# Patient Record
Sex: MALE | Race: WHITE | NOT HISPANIC OR LATINO | Employment: FULL TIME
[De-identification: names, ages, dates, MRNs, and addresses within clinical notes are randomized per-mention and may not be internally consistent; named-entity substitution may affect disease eponyms.]

---

## 2017-04-25 PROCEDURE — 82043 UR ALBUMIN QUANTITATIVE: CPT | Performed by: INTERNAL MEDICINE

## 2017-04-25 PROCEDURE — 82570 ASSAY OF URINE CREATININE: CPT | Performed by: INTERNAL MEDICINE

## 2017-06-13 PROCEDURE — 84156 ASSAY OF PROTEIN URINE: CPT | Performed by: INTERNAL MEDICINE

## 2017-06-13 PROCEDURE — 81001 URINALYSIS AUTO W/SCOPE: CPT | Performed by: INTERNAL MEDICINE

## 2017-06-13 PROCEDURE — 82570 ASSAY OF URINE CREATININE: CPT | Performed by: INTERNAL MEDICINE

## 2017-06-13 PROCEDURE — 82043 UR ALBUMIN QUANTITATIVE: CPT | Performed by: INTERNAL MEDICINE

## 2017-07-11 ENCOUNTER — DIAGNOSTIC TRANS (OUTPATIENT)
Dept: OTHER | Age: 47
End: 2017-07-11

## 2017-07-11 ENCOUNTER — HOSPITAL (OUTPATIENT)
Dept: OTHER | Age: 47
End: 2017-07-11
Attending: INTERNAL MEDICINE

## 2017-07-11 LAB
INR PPP: 3.1
PROTHROMBIN TIME: 34.3 SECONDS (ref 9.7–11.8)
PROTHROMBIN TIME: ABNORMAL

## 2017-07-12 ENCOUNTER — CHARTING TRANS (OUTPATIENT)
Dept: OTHER | Age: 47
End: 2017-07-12

## 2017-07-13 PROCEDURE — 82043 UR ALBUMIN QUANTITATIVE: CPT | Performed by: INTERNAL MEDICINE

## 2017-07-13 PROCEDURE — 84156 ASSAY OF PROTEIN URINE: CPT | Performed by: INTERNAL MEDICINE

## 2017-07-13 PROCEDURE — 81003 URINALYSIS AUTO W/O SCOPE: CPT | Performed by: INTERNAL MEDICINE

## 2017-07-13 PROCEDURE — 82570 ASSAY OF URINE CREATININE: CPT | Performed by: INTERNAL MEDICINE

## 2017-08-08 ENCOUNTER — HOSPITAL (OUTPATIENT)
Dept: OTHER | Age: 47
End: 2017-08-08
Attending: INTERNAL MEDICINE

## 2017-08-08 LAB
BUN SERPL-MCNC: 17 MG/DL (ref 6–20)
BUN/CREAT SERPL: 17 (ref 7–25)
CREAT SERPL-MCNC: 1.01 MG/DL (ref 0.67–1.17)
INR PPP: 2.6
PROTHROMBIN TIME: 28.5 SECONDS (ref 9.7–11.8)
PROTHROMBIN TIME: ABNORMAL

## 2017-08-09 ENCOUNTER — CHARTING TRANS (OUTPATIENT)
Dept: OTHER | Age: 47
End: 2017-08-09

## 2017-08-09 LAB
INR PPP: 2.9
PROTHROMBIN TIME: 32.3 SECONDS (ref 9.7–11.8)
PROTHROMBIN TIME: ABNORMAL

## 2017-08-24 ENCOUNTER — HOSPITAL (OUTPATIENT)
Dept: OTHER | Age: 47
End: 2017-08-24
Attending: INTERNAL MEDICINE

## 2017-08-24 ENCOUNTER — CHARTING TRANS (OUTPATIENT)
Dept: OTHER | Age: 47
End: 2017-08-24

## 2017-08-24 ENCOUNTER — DIAGNOSTIC TRANS (OUTPATIENT)
Dept: OTHER | Age: 47
End: 2017-08-24

## 2017-08-24 LAB
ANALYZER ANC (IANC): NORMAL
ANION GAP SERPL CALC-SCNC: 11 MMOL/L (ref 10–20)
APTT PPP: 34 SECONDS (ref 22–30)
APTT PPP: ABNORMAL S
BASOPHILS # BLD: 0 THOUSAND/MCL (ref 0–0.3)
BASOPHILS NFR BLD: 1 %
BUN SERPL-MCNC: 14 MG/DL (ref 6–20)
BUN/CREAT SERPL: 14 (ref 7–25)
CALCIUM SERPL-MCNC: 9.1 MG/DL (ref 8.4–10.2)
CHLORIDE: 106 MMOL/L (ref 98–107)
CO2 SERPL-SCNC: 28 MMOL/L (ref 21–32)
CREAT SERPL-MCNC: 0.99 MG/DL (ref 0.67–1.17)
DIFFERENTIAL METHOD BLD: NORMAL
EOSINOPHIL # BLD: 0.2 THOUSAND/MCL (ref 0.1–0.5)
EOSINOPHIL NFR BLD: 3 %
ERYTHROCYTE [DISTWIDTH] IN BLOOD: 12.1 % (ref 11–15)
GLUCOSE SERPL-MCNC: 132 MG/DL (ref 65–99)
HEMATOCRIT: 42.6 % (ref 39–51)
HGB BLD-MCNC: 15.2 GM/DL (ref 13–17)
INR PPP: 2.4
LYMPHOCYTES # BLD: 1.6 THOUSAND/MCL (ref 1–4.8)
LYMPHOCYTES NFR BLD: 34 %
MCH RBC QN AUTO: 32.3 PG (ref 26–34)
MCHC RBC AUTO-ENTMCNC: 35.7 GM/DL (ref 32–36.5)
MCV RBC AUTO: 90.6 FL (ref 78–100)
MONOCYTES # BLD: 0.4 THOUSAND/MCL (ref 0.3–0.9)
MONOCYTES NFR BLD: 8 %
NEUTROPHILS # BLD: 2.6 THOUSAND/MCL (ref 1.8–7.7)
NEUTROPHILS NFR BLD: 54 %
NEUTS SEG NFR BLD: NORMAL %
PERCENT NRBC: NORMAL
PLATELET # BLD: 179 THOUSAND/MCL (ref 140–450)
POTASSIUM SERPL-SCNC: 3.5 MMOL/L (ref 3.4–5.1)
PROTHROMBIN TIME: 26.7 SECONDS (ref 9.7–11.8)
PROTHROMBIN TIME: ABNORMAL
RBC # BLD: 4.7 MILLION/MCL (ref 4.5–5.9)
SODIUM SERPL-SCNC: 141 MMOL/L (ref 135–145)
TROPONIN I SERPL HS-MCNC: 0.12 NG/ML
TROPONIN I SERPL HS-MCNC: 0.13 NG/ML
WBC # BLD: 4.7 THOUSAND/MCL (ref 4.2–11)

## 2017-10-04 PROCEDURE — 84156 ASSAY OF PROTEIN URINE: CPT | Performed by: INTERNAL MEDICINE

## 2017-10-04 PROCEDURE — 81001 URINALYSIS AUTO W/SCOPE: CPT | Performed by: INTERNAL MEDICINE

## 2017-10-04 PROCEDURE — 36415 COLL VENOUS BLD VENIPUNCTURE: CPT | Performed by: INTERNAL MEDICINE

## 2017-10-04 PROCEDURE — 82570 ASSAY OF URINE CREATININE: CPT | Performed by: INTERNAL MEDICINE

## 2017-10-04 PROCEDURE — 82043 UR ALBUMIN QUANTITATIVE: CPT | Performed by: INTERNAL MEDICINE

## 2017-10-28 PROCEDURE — 84153 ASSAY OF PSA TOTAL: CPT | Performed by: INTERNAL MEDICINE

## 2018-02-07 PROBLEM — Z80.0 FAMILY HISTORY OF COLON CANCER: Status: ACTIVE | Noted: 2018-02-07

## 2018-04-03 PROBLEM — G47.33 OSA ON CPAP: Status: ACTIVE | Noted: 2018-04-03

## 2018-04-03 PROBLEM — Z99.89 OSA ON CPAP: Status: ACTIVE | Noted: 2018-04-03

## 2018-09-17 PROCEDURE — 88305 TISSUE EXAM BY PATHOLOGIST: CPT | Performed by: INTERNAL MEDICINE

## 2019-01-28 PROCEDURE — 82570 ASSAY OF URINE CREATININE: CPT | Performed by: INTERNAL MEDICINE

## 2019-01-28 PROCEDURE — 82043 UR ALBUMIN QUANTITATIVE: CPT | Performed by: INTERNAL MEDICINE

## 2019-01-28 PROCEDURE — 81001 URINALYSIS AUTO W/SCOPE: CPT | Performed by: INTERNAL MEDICINE

## 2019-01-28 PROCEDURE — 84156 ASSAY OF PROTEIN URINE: CPT | Performed by: INTERNAL MEDICINE

## 2019-03-09 PROCEDURE — 86200 CCP ANTIBODY: CPT | Performed by: INTERNAL MEDICINE

## 2019-06-27 PROCEDURE — 80177 DRUG SCRN QUAN LEVETIRACETAM: CPT | Performed by: OTHER

## 2019-06-27 PROCEDURE — 36415 COLL VENOUS BLD VENIPUNCTURE: CPT | Performed by: OTHER

## 2019-07-11 ENCOUNTER — DIAGNOSTIC TRANS (OUTPATIENT)
Dept: OTHER | Age: 49
End: 2019-07-11

## 2019-07-11 LAB
INR PPP: 4.3
INR PPP: ABNORMAL
PROTHROMBIN TIME (PRT2): ABNORMAL
PROTHROMBIN TIME: 40.5 SEC (ref 9.7–11.8)

## 2019-09-11 ENCOUNTER — HOSPITAL (OUTPATIENT)
Dept: OTHER | Age: 49
End: 2019-09-11
Attending: INTERNAL MEDICINE

## 2019-12-23 PROBLEM — H81.11 BPPV (BENIGN PAROXYSMAL POSITIONAL VERTIGO), RIGHT: Status: ACTIVE | Noted: 2019-12-23

## 2020-05-15 PROBLEM — R56.9 SEIZURE (HCC): Status: ACTIVE | Noted: 2020-05-15

## 2020-05-15 PROBLEM — H83.2X2 VESTIBULAR DYSFUNCTION OF LEFT EAR: Status: ACTIVE | Noted: 2020-05-15

## 2020-05-15 PROBLEM — R42 VERTIGO: Status: ACTIVE | Noted: 2020-05-15

## 2020-05-15 PROBLEM — H81.92 VESTIBULAR DYSFUNCTION OF LEFT EAR: Status: ACTIVE | Noted: 2020-05-15

## 2020-05-15 PROBLEM — R42 DIZZY: Status: ACTIVE | Noted: 2020-05-15

## 2020-12-29 PROBLEM — H81.90 VESTIBULAR DYSFUNCTION, UNSPECIFIED LATERALITY: Status: ACTIVE | Noted: 2020-12-29

## 2020-12-29 PROBLEM — H83.2X9 VESTIBULAR DYSFUNCTION, UNSPECIFIED LATERALITY: Status: ACTIVE | Noted: 2020-12-29

## 2021-01-22 PROCEDURE — 88305 TISSUE EXAM BY PATHOLOGIST: CPT | Performed by: INTERNAL MEDICINE

## 2021-02-03 ENCOUNTER — TELEPHONE (OUTPATIENT)
Dept: CT IMAGING | Age: 51
End: 2021-02-03

## 2021-02-12 ENCOUNTER — HOSPITAL ENCOUNTER (OUTPATIENT)
Dept: CT IMAGING | Age: 51
Discharge: HOME | End: 2021-02-12
Attending: INTERNAL MEDICINE

## 2021-02-12 VITALS — SYSTOLIC BLOOD PRESSURE: 146 MMHG | HEART RATE: 55 BPM | DIASTOLIC BLOOD PRESSURE: 81 MMHG

## 2021-02-12 DIAGNOSIS — I48.91 ATRIAL FIBRILLATION, UNSPECIFIED TYPE (CMD): ICD-10-CM

## 2021-02-12 DIAGNOSIS — I42.9 CARDIOMYOPATHY, UNSPECIFIED TYPE (CMD): ICD-10-CM

## 2021-02-12 PROCEDURE — 75574 CT ANGIO HRT W/3D IMAGE: CPT

## 2021-02-12 PROCEDURE — 10002805 HB CONTRAST AGENT: Performed by: INTERNAL MEDICINE

## 2021-02-12 RX ADMIN — IOHEXOL 80 ML: 350 INJECTION, SOLUTION INTRAVENOUS at 12:45

## 2021-02-22 ENCOUNTER — LAB ENCOUNTER (OUTPATIENT)
Dept: LAB | Age: 51
End: 2021-02-22
Attending: INTERNAL MEDICINE
Payer: COMMERCIAL

## 2021-02-22 DIAGNOSIS — Z01.818 PRE-OP TESTING: ICD-10-CM

## 2021-02-23 LAB — SARS-COV-2 RNA RESP QL NAA+PROBE: NOT DETECTED

## 2021-02-25 ENCOUNTER — ANESTHESIA (OUTPATIENT)
Dept: ENDOSCOPY | Facility: HOSPITAL | Age: 51
End: 2021-02-25
Payer: COMMERCIAL

## 2021-02-25 ENCOUNTER — HOSPITAL ENCOUNTER (OUTPATIENT)
Facility: HOSPITAL | Age: 51
Setting detail: HOSPITAL OUTPATIENT SURGERY
Discharge: HOME | End: 2021-02-25
Attending: INTERNAL MEDICINE | Admitting: INTERNAL MEDICINE
Payer: COMMERCIAL

## 2021-02-25 ENCOUNTER — ANESTHESIA EVENT (OUTPATIENT)
Dept: ENDOSCOPY | Facility: HOSPITAL | Age: 51
End: 2021-02-25
Payer: COMMERCIAL

## 2021-02-25 VITALS
WEIGHT: 180 LBS | HEART RATE: 60 BPM | SYSTOLIC BLOOD PRESSURE: 112 MMHG | RESPIRATION RATE: 12 BRPM | BODY MASS INDEX: 29.99 KG/M2 | DIASTOLIC BLOOD PRESSURE: 79 MMHG | TEMPERATURE: 98 F | OXYGEN SATURATION: 96 % | HEIGHT: 65 IN

## 2021-02-25 DIAGNOSIS — K31.89 GASTRIC NODULE: ICD-10-CM

## 2021-02-25 DIAGNOSIS — K44.9 HIATAL HERNIA: ICD-10-CM

## 2021-02-25 DIAGNOSIS — K22.711 BARRETT'S ESOPHAGUS WITH HIGH GRADE DYSPLASIA: ICD-10-CM

## 2021-02-25 DIAGNOSIS — K22.9 IRREGULAR Z LINE OF ESOPHAGUS: ICD-10-CM

## 2021-02-25 DIAGNOSIS — Z01.818 PRE-OP TESTING: Primary | ICD-10-CM

## 2021-02-25 PROCEDURE — 88305 TISSUE EXAM BY PATHOLOGIST: CPT | Performed by: INTERNAL MEDICINE

## 2021-02-25 PROCEDURE — 0DB48ZX EXCISION OF ESOPHAGOGASTRIC JUNCTION, VIA NATURAL OR ARTIFICIAL OPENING ENDOSCOPIC, DIAGNOSTIC: ICD-10-PCS | Performed by: INTERNAL MEDICINE

## 2021-02-25 PROCEDURE — 88342 IMHCHEM/IMCYTCHM 1ST ANTB: CPT | Performed by: INTERNAL MEDICINE

## 2021-02-25 PROCEDURE — 88341 IMHCHEM/IMCYTCHM EA ADD ANTB: CPT | Performed by: INTERNAL MEDICINE

## 2021-02-25 PROCEDURE — 3E0G8GC INTRODUCTION OF OTHER THERAPEUTIC SUBSTANCE INTO UPPER GI, VIA NATURAL OR ARTIFICIAL OPENING ENDOSCOPIC: ICD-10-PCS | Performed by: INTERNAL MEDICINE

## 2021-02-25 RX ORDER — NALOXONE HYDROCHLORIDE 0.4 MG/ML
80 INJECTION, SOLUTION INTRAMUSCULAR; INTRAVENOUS; SUBCUTANEOUS AS NEEDED
Status: DISCONTINUED | OUTPATIENT
Start: 2021-02-25 | End: 2021-02-25

## 2021-02-25 RX ORDER — LIDOCAINE HYDROCHLORIDE 10 MG/ML
INJECTION, SOLUTION EPIDURAL; INFILTRATION; INTRACAUDAL; PERINEURAL AS NEEDED
Status: DISCONTINUED | OUTPATIENT
Start: 2021-02-25 | End: 2021-02-25 | Stop reason: SURG

## 2021-02-25 RX ORDER — SODIUM CHLORIDE, SODIUM LACTATE, POTASSIUM CHLORIDE, CALCIUM CHLORIDE 600; 310; 30; 20 MG/100ML; MG/100ML; MG/100ML; MG/100ML
INJECTION, SOLUTION INTRAVENOUS CONTINUOUS
Status: DISCONTINUED | OUTPATIENT
Start: 2021-02-25 | End: 2021-02-25

## 2021-02-25 RX ADMIN — LIDOCAINE HYDROCHLORIDE 50 MG: 10 INJECTION, SOLUTION EPIDURAL; INFILTRATION; INTRACAUDAL; PERINEURAL at 09:05:00

## 2021-02-25 RX ADMIN — SODIUM CHLORIDE, SODIUM LACTATE, POTASSIUM CHLORIDE, CALCIUM CHLORIDE: 600; 310; 30; 20 INJECTION, SOLUTION INTRAVENOUS at 10:12:00

## 2021-02-25 RX ADMIN — SODIUM CHLORIDE, SODIUM LACTATE, POTASSIUM CHLORIDE, CALCIUM CHLORIDE: 600; 310; 30; 20 INJECTION, SOLUTION INTRAVENOUS at 09:06:00

## 2021-02-25 NOTE — OPERATIVE REPORT
OPERATIVE REPORT   PATIENT NAME: Danielle Alvarez  MRN: [id number]  DATE OF OPERATION: 2/25/2021  PREOPERATIVE DIAGNOSIS:   1. 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 Dalila Manley on January 22, 2021. hiatal hernia Hill grade 2 was seen. The rest of the stomach examination was overall unremarkable. The bulb and second portion duodenum appeared unremarkable. The scope was then pulled out after suctioning the gastric content.     Next, the oblique hard

## 2021-02-25 NOTE — ANESTHESIA POSTPROCEDURE EVALUATION
Patient: Farhat Mojica    Procedure Summary     Date: 02/25/21 Room / Location: [facility] ENDOSCOPY 01 / [facility] ENDOSCOPY    Anesthesia Start: 0214 Anesthesia Stop:     Procedure: ESOPHAGOGASTRODUODENOSCOPY (EGD) (N/A ) Diagnosis:       Tello's esophagus with hig

## 2021-02-25 NOTE — ANESTHESIA PREPROCEDURE EVALUATION
Anesthesia PreOp Note    HPI:     Roman Purvis is a 48year old male who presents for preoperative consultation requested by: Lennox Riggers, MD    Date of Surgery: 2/25/2021    Procedure(s):  ESOPHAGOGASTRODUODENOSCOPY (EGD)  ENDOSCOPIC ULTRASOUND (EUS) anesthesia - states he is okay with propofol   • Arrhythmia    • Atrial fibrillation, unspecified type ([facility] [address] 1/7/2016   • Cardiac defibrillator in place    • Family history of colon cancer 2/7/2018   • Hematuria, gross 8/20/2014   • High blood pressure (two) times daily before meals. , Disp: 60 capsule, Rfl: 3, 2/24/2021    •  WARFARIN SODIUM 3 MG Oral Tab, TAKE 1 TABLET BY MOUTH EVERY EVENING OR AS DIRECTED BY COUMADIN CLINIC, Disp: 90 tablet, Rfl: 3, 2/19/2021    •  SIMVASTATIN 20 MG Oral Tab, TAKE 1 TA Occupational History      Not on file    Social Needs      Financial resource strain: Not on file      Food insecurity        Worry: Not on file        Inability: Not on file      Transportation needs        Medical: Not on file        Non-medical: Not on Component Value Date    BUN 12.0 02/02/2021    CREATSERUM 0.99 02/02/2021     Lab Results   Component Value Date    INR 3.0 (A) 02/16/2021       Vital Signs: Body mass index is 29.95 kg/m². height is 1.651 m (5' 5\") and weight is 81.6 kg (180 lb).  Hi thickness is  24.7 mm.   5.  Please refer to the radiologist's interpretation of non-cardiac  structures. Neuro/Psych    (+)  seizures (Last seizure 2015; stable on Keppra. Last neurology checkup within past month), CVA (2013.  Residual occasional word f

## 2021-03-03 DIAGNOSIS — Z23 NEED FOR VACCINATION: ICD-10-CM

## 2021-03-05 PROBLEM — C15.5 MALIGNANT NEOPLASM OF LOWER THIRD OF ESOPHAGUS (HCC): Status: ACTIVE | Noted: 2021-03-05

## 2021-03-08 PROBLEM — C16.0: Status: ACTIVE | Noted: 2021-03-05

## 2021-04-09 PROBLEM — C15.9 ESOPHAGEAL CANCER (HCC): Status: ACTIVE | Noted: 2021-04-09

## 2021-05-14 PROBLEM — I82.622 ARM DVT (DEEP VENOUS THROMBOEMBOLISM), ACUTE, LEFT (HCC): Status: ACTIVE | Noted: 2021-05-06

## 2021-05-14 PROBLEM — Z98.62 H/O ANGIOPLASTY: Status: ACTIVE | Noted: 2021-05-07

## 2022-05-23 DIAGNOSIS — Z01.812 PRE-PROCEDURE LAB EXAM: Primary | ICD-10-CM

## 2022-05-26 RX ORDER — DILTIAZEM HYDROCHLORIDE 240 MG/1
240 CAPSULE, EXTENDED RELEASE ORAL DAILY
COMMUNITY

## 2022-05-26 RX ORDER — SERTRALINE HYDROCHLORIDE 25 MG/1
25 TABLET, FILM COATED ORAL DAILY
COMMUNITY

## 2022-05-26 RX ORDER — METOPROLOL SUCCINATE 25 MG/1
25 TABLET, EXTENDED RELEASE ORAL 2 TIMES DAILY
COMMUNITY

## 2022-05-26 RX ORDER — OMEPRAZOLE 40 MG/1
40 CAPSULE, DELAYED RELEASE ORAL
COMMUNITY

## 2022-05-26 RX ORDER — SOTALOL HYDROCHLORIDE 120 MG/1
120 TABLET ORAL 2 TIMES DAILY
COMMUNITY

## 2022-05-26 RX ORDER — ALPRAZOLAM 0.25 MG/1
0.25 TABLET ORAL 2 TIMES DAILY PRN
COMMUNITY

## 2022-05-26 RX ORDER — WARFARIN SODIUM 3 MG/1
1.5 TABLET ORAL
COMMUNITY

## 2022-05-26 RX ORDER — WARFARIN SODIUM 3 MG/1
3 TABLET ORAL
COMMUNITY

## 2022-05-26 RX ORDER — SIMVASTATIN 20 MG
20 TABLET ORAL EVERY EVENING
COMMUNITY

## 2022-05-29 ENCOUNTER — LAB SERVICES (OUTPATIENT)
Dept: LAB | Age: 52
End: 2022-05-29

## 2022-05-29 DIAGNOSIS — Z01.812 PRE-PROCEDURE LAB EXAM: ICD-10-CM

## 2022-05-29 PROCEDURE — U0005 INFEC AGEN DETEC AMPLI PROBE: HCPCS | Performed by: INTERNAL MEDICINE

## 2022-05-29 PROCEDURE — U0003 INFECTIOUS AGENT DETECTION BY NUCLEIC ACID (DNA OR RNA); SEVERE ACUTE RESPIRATORY SYNDROME CORONAVIRUS 2 (SARS-COV-2) (CORONAVIRUS DISEASE [COVID-19]), AMPLIFIED PROBE TECHNIQUE, MAKING USE OF HIGH THROUGHPUT TECHNOLOGIES AS DESCRIBED BY CMS-2020-01-R: HCPCS | Performed by: INTERNAL MEDICINE

## 2022-05-30 LAB
SARS-COV-2 RNA RESP QL NAA+PROBE: NOT DETECTED
SERVICE CMNT-IMP: NORMAL
SERVICE CMNT-IMP: NORMAL

## 2022-05-31 ENCOUNTER — ANESTHESIA (OUTPATIENT)
Dept: CARDIOLOGY | Age: 52
End: 2022-05-31

## 2022-05-31 ENCOUNTER — ANESTHESIA EVENT (OUTPATIENT)
Dept: CARDIOLOGY | Age: 52
End: 2022-05-31

## 2022-05-31 ENCOUNTER — HOSPITAL ENCOUNTER (OUTPATIENT)
Age: 52
Discharge: HOME | End: 2022-05-31
Attending: INTERNAL MEDICINE | Admitting: INTERNAL MEDICINE

## 2022-05-31 ENCOUNTER — APPOINTMENT (OUTPATIENT)
Dept: CARDIOLOGY | Age: 52
End: 2022-05-31
Attending: INTERNAL MEDICINE

## 2022-05-31 VITALS
TEMPERATURE: 97.2 F | BODY MASS INDEX: 25.27 KG/M2 | HEIGHT: 65 IN | RESPIRATION RATE: 18 BRPM | WEIGHT: 151.68 LBS | SYSTOLIC BLOOD PRESSURE: 93 MMHG | OXYGEN SATURATION: 99 % | HEART RATE: 87 BPM | DIASTOLIC BLOOD PRESSURE: 72 MMHG

## 2022-05-31 DIAGNOSIS — I48.92 ATRIAL FLUTTER, UNSPECIFIED TYPE (CMD): ICD-10-CM

## 2022-05-31 DIAGNOSIS — I48.91 ATRIAL FIBRILLATION, UNSPECIFIED TYPE (CMD): ICD-10-CM

## 2022-05-31 LAB
ACT BLD: 400 BASELINE/TARGET RANGES ARE SET BY CLINICIANS FOR EACH PATIENT/PROCEDURE
ATRIAL RATE (BPM): 60
INR PPP: 3.1
P AXIS (DEGREES): 89
PR-INTERVAL (MSEC): 240
PROTHROMBIN TIME: 31.5 SEC (ref 9.7–11.8)
QRS-INTERVAL (MSEC): 86
QT-INTERVAL (MSEC): 484
QTC: 484
R AXIS (DEGREES): -27
REPORT TEXT: NORMAL
T AXIS (DEGREES): 149
VENTRICULAR RATE EKG/MIN (BPM): 60

## 2022-05-31 PROCEDURE — 10002803 HB RX 637: Performed by: ANESTHESIOLOGY

## 2022-05-31 PROCEDURE — 93308 TTE F-UP OR LMTD: CPT

## 2022-05-31 PROCEDURE — 85610 PROTHROMBIN TIME: CPT | Performed by: INTERNAL MEDICINE

## 2022-05-31 PROCEDURE — C1766 INTRO/SHEATH,STRBLE,NON-PEEL: HCPCS | Performed by: INTERNAL MEDICINE

## 2022-05-31 PROCEDURE — 93010 ELECTROCARDIOGRAM REPORT: CPT | Performed by: INTERNAL MEDICINE

## 2022-05-31 PROCEDURE — 13000008 HB ANESTHESIA MAC OUTSIDE OR: Performed by: INTERNAL MEDICINE

## 2022-05-31 PROCEDURE — 10002807 HB RX 258: Performed by: INTERNAL MEDICINE

## 2022-05-31 PROCEDURE — 10002801 HB RX 250 W/O HCPCS: Performed by: INTERNAL MEDICINE

## 2022-05-31 PROCEDURE — 85347 COAGULATION TIME ACTIVATED: CPT | Performed by: INTERNAL MEDICINE

## 2022-05-31 PROCEDURE — C1733 CATH, EP, OTHR THAN COOL-TIP: HCPCS | Performed by: INTERNAL MEDICINE

## 2022-05-31 PROCEDURE — 10006027 HB SUPPLY 278: Performed by: INTERNAL MEDICINE

## 2022-05-31 PROCEDURE — 93655 ICAR CATH ABLTJ DSCRT ARRHYT: CPT | Performed by: INTERNAL MEDICINE

## 2022-05-31 PROCEDURE — C2630 CATH, EP, COOL-TIP: HCPCS | Performed by: INTERNAL MEDICINE

## 2022-05-31 PROCEDURE — 10002805 HB CONTRAST AGENT: Performed by: INTERNAL MEDICINE

## 2022-05-31 PROCEDURE — C1730 CATH, EP, 19 OR FEW ELECT: HCPCS | Performed by: INTERNAL MEDICINE

## 2022-05-31 PROCEDURE — 10006023 HB SUPPLY 272: Performed by: INTERNAL MEDICINE

## 2022-05-31 PROCEDURE — C1731 CATH, EP, 20 OR MORE ELEC: HCPCS | Performed by: INTERNAL MEDICINE

## 2022-05-31 PROCEDURE — 10002807 HB RX 258: Performed by: ANESTHESIOLOGY

## 2022-05-31 PROCEDURE — 10004451 HB PACU RECOVERY 1ST 30 MINUTES: Performed by: INTERNAL MEDICINE

## 2022-05-31 PROCEDURE — 13000001 HB PHASE II RECOVERY EA 30 MINUTES: Performed by: INTERNAL MEDICINE

## 2022-05-31 PROCEDURE — 93005 ELECTROCARDIOGRAM TRACING: CPT | Performed by: INTERNAL MEDICINE

## 2022-05-31 PROCEDURE — C1760 CLOSURE DEV, VASC: HCPCS | Performed by: INTERNAL MEDICINE

## 2022-05-31 PROCEDURE — 93656 COMPRE EP EVAL ABLTJ ATR FIB: CPT | Performed by: INTERNAL MEDICINE

## 2022-05-31 PROCEDURE — 10002803 HB RX 637: Performed by: INTERNAL MEDICINE

## 2022-05-31 PROCEDURE — 10002800 HB RX 250 W HCPCS: Performed by: ANESTHESIOLOGY

## 2022-05-31 PROCEDURE — 10002801 HB RX 250 W/O HCPCS: Performed by: ANESTHESIOLOGY

## 2022-05-31 PROCEDURE — C1759 CATH, INTRA ECHOCARDIOGRAPHY: HCPCS | Performed by: INTERNAL MEDICINE

## 2022-05-31 PROCEDURE — 10004452 HB PACU ADDL 30 MINUTES: Performed by: INTERNAL MEDICINE

## 2022-05-31 PROCEDURE — C1894 INTRO/SHEATH, NON-LASER: HCPCS | Performed by: INTERNAL MEDICINE

## 2022-05-31 DEVICE — THE VASCADE MVP VENOUS VASCULAR CLOSURE SYSTEM (VVCS) IS INTENDED TO SEAL FEMORAL VEINS WITH SINGLE OR MULTIPLE ACCESS SITES IN ONE OR BOTH LIMBS AT THE COMPLETION OF CATHETERIZATION PROCEDURES. THE SYSTEM IS DESIGNED TO DELIVER A RESORBABLE COLLAGEN PATCH, EXTRA-VASCULARLY, AT VESSEL PUNCTURE SITES TO ACHIEVE HEMOSTASIS. FOR USE WITH 6FR TO 12FR (15F MAXIMUM OUTER DIAMETER) INTRODUCER SHEATHS; OVERALL LENGTH OF THE SHEATH (INCLUDING THE HUB) NEEDS TO BE LESS THAN 15CM.
Type: IMPLANTABLE DEVICE | Site: FEMORAL VEIN | Status: FUNCTIONAL
Brand: CARDIVA VASCADE MVP VVCS 6-12F

## 2022-05-31 RX ORDER — LIDOCAINE HYDROCHLORIDE 20 MG/ML
INJECTION, SOLUTION INFILTRATION; PERINEURAL PRN
Status: DISCONTINUED | OUTPATIENT
Start: 2022-05-31 | End: 2022-05-31

## 2022-05-31 RX ORDER — ALBUTEROL SULFATE 2.5 MG/3ML
5 SOLUTION RESPIRATORY (INHALATION) ONCE
Status: DISCONTINUED | OUTPATIENT
Start: 2022-05-31 | End: 2022-05-31 | Stop reason: HOSPADM

## 2022-05-31 RX ORDER — SODIUM CHLORIDE, SODIUM LACTATE, POTASSIUM CHLORIDE, CALCIUM CHLORIDE 600; 310; 30; 20 MG/100ML; MG/100ML; MG/100ML; MG/100ML
INJECTION, SOLUTION INTRAVENOUS CONTINUOUS PRN
Status: DISCONTINUED | OUTPATIENT
Start: 2022-05-31 | End: 2022-05-31

## 2022-05-31 RX ORDER — HEPARIN SODIUM 1000 [USP'U]/ML
INJECTION, SOLUTION INTRAVENOUS; SUBCUTANEOUS PRN
Status: DISCONTINUED | OUTPATIENT
Start: 2022-05-31 | End: 2022-05-31

## 2022-05-31 RX ORDER — HYDROCODONE BITARTRATE AND ACETAMINOPHEN 5; 325 MG/1; MG/1
1 TABLET ORAL EVERY 4 HOURS PRN
Status: DISCONTINUED | OUTPATIENT
Start: 2022-05-31 | End: 2022-05-31 | Stop reason: HOSPADM

## 2022-05-31 RX ORDER — ACETAMINOPHEN 325 MG/1
650 TABLET ORAL EVERY 4 HOURS PRN
Status: DISCONTINUED | OUTPATIENT
Start: 2022-05-31 | End: 2022-05-31 | Stop reason: HOSPADM

## 2022-05-31 RX ORDER — 0.9 % SODIUM CHLORIDE 0.9 %
2 VIAL (ML) INJECTION EVERY 12 HOURS SCHEDULED
Status: DISCONTINUED | OUTPATIENT
Start: 2022-05-31 | End: 2022-05-31 | Stop reason: HOSPADM

## 2022-05-31 RX ORDER — PROPOFOL 10 MG/ML
INJECTION, EMULSION INTRAVENOUS PRN
Status: DISCONTINUED | OUTPATIENT
Start: 2022-05-31 | End: 2022-05-31

## 2022-05-31 RX ORDER — METOCLOPRAMIDE HYDROCHLORIDE 5 MG/ML
INJECTION INTRAMUSCULAR; INTRAVENOUS PRN
Status: DISCONTINUED | OUTPATIENT
Start: 2022-05-31 | End: 2022-05-31

## 2022-05-31 RX ORDER — ONDANSETRON 4 MG/1
4 TABLET, ORALLY DISINTEGRATING ORAL EVERY 12 HOURS PRN
Status: DISCONTINUED | OUTPATIENT
Start: 2022-05-31 | End: 2022-05-31 | Stop reason: HOSPADM

## 2022-05-31 RX ORDER — MIDAZOLAM HYDROCHLORIDE 1 MG/ML
INJECTION, SOLUTION INTRAMUSCULAR; INTRAVENOUS PRN
Status: DISCONTINUED | OUTPATIENT
Start: 2022-05-31 | End: 2022-05-31

## 2022-05-31 RX ORDER — FAMOTIDINE 10 MG/ML
20 INJECTION, SOLUTION INTRAVENOUS ONCE
Status: DISCONTINUED | OUTPATIENT
Start: 2022-05-31 | End: 2022-05-31 | Stop reason: HOSPADM

## 2022-05-31 RX ORDER — HYDRALAZINE HYDROCHLORIDE 20 MG/ML
5 INJECTION INTRAMUSCULAR; INTRAVENOUS EVERY 10 MIN PRN
Status: DISCONTINUED | OUTPATIENT
Start: 2022-05-31 | End: 2022-05-31 | Stop reason: HOSPADM

## 2022-05-31 RX ORDER — SODIUM CHLORIDE, SODIUM LACTATE, POTASSIUM CHLORIDE, CALCIUM CHLORIDE 600; 310; 30; 20 MG/100ML; MG/100ML; MG/100ML; MG/100ML
INJECTION, SOLUTION INTRAVENOUS CONTINUOUS
Status: DISCONTINUED | OUTPATIENT
Start: 2022-05-31 | End: 2022-05-31 | Stop reason: HOSPADM

## 2022-05-31 RX ORDER — ONDANSETRON 2 MG/ML
4 INJECTION INTRAMUSCULAR; INTRAVENOUS EVERY 12 HOURS PRN
Status: DISCONTINUED | OUTPATIENT
Start: 2022-05-31 | End: 2022-05-31 | Stop reason: HOSPADM

## 2022-05-31 RX ORDER — FAMOTIDINE 20 MG/1
TABLET, FILM COATED ORAL PRN
Status: DISCONTINUED | OUTPATIENT
Start: 2022-05-31 | End: 2022-05-31

## 2022-05-31 RX ORDER — ONDANSETRON 2 MG/ML
4 INJECTION INTRAMUSCULAR; INTRAVENOUS ONCE
Status: DISCONTINUED | OUTPATIENT
Start: 2022-05-31 | End: 2022-05-31 | Stop reason: HOSPADM

## 2022-05-31 RX ORDER — PROCHLORPERAZINE EDISYLATE 5 MG/ML
5 INJECTION INTRAMUSCULAR; INTRAVENOUS EVERY 4 HOURS PRN
Status: DISCONTINUED | OUTPATIENT
Start: 2022-05-31 | End: 2022-05-31 | Stop reason: HOSPADM

## 2022-05-31 RX ORDER — GLYCOPYRROLATE 0.2 MG/ML
INJECTION, SOLUTION INTRAMUSCULAR; INTRAVENOUS PRN
Status: DISCONTINUED | OUTPATIENT
Start: 2022-05-31 | End: 2022-05-31

## 2022-05-31 RX ORDER — SODIUM CHLORIDE 9 MG/ML
INJECTION, SOLUTION INTRAVENOUS CONTINUOUS
Status: DISCONTINUED | OUTPATIENT
Start: 2022-05-31 | End: 2022-05-31 | Stop reason: HOSPADM

## 2022-05-31 RX ORDER — BUPIVACAINE HYDROCHLORIDE 5 MG/ML
INJECTION, SOLUTION EPIDURAL; INTRACAUDAL PRN
Status: DISCONTINUED | OUTPATIENT
Start: 2022-05-31 | End: 2022-05-31 | Stop reason: HOSPADM

## 2022-05-31 RX ADMIN — HYDROCODONE BITARTRATE AND ACETAMINOPHEN 1 TABLET: 5; 325 TABLET ORAL at 12:28

## 2022-05-31 RX ADMIN — HEPARIN SODIUM 7000 UNITS: 1000 INJECTION, SOLUTION INTRAVENOUS; SUBCUTANEOUS at 09:09

## 2022-05-31 RX ADMIN — SODIUM CHLORIDE: 9 INJECTION, SOLUTION INTRAVENOUS at 07:44

## 2022-05-31 RX ADMIN — LIDOCAINE HYDROCHLORIDE 5 ML: 20 INJECTION, SOLUTION INFILTRATION; PERINEURAL at 08:38

## 2022-05-31 RX ADMIN — MIDAZOLAM HYDROCHLORIDE 2 MG: 1 INJECTION, SOLUTION INTRAMUSCULAR; INTRAVENOUS at 08:29

## 2022-05-31 RX ADMIN — PROPOFOL 50 MG: 10 INJECTION, EMULSION INTRAVENOUS at 08:38

## 2022-05-31 RX ADMIN — METOCLOPRAMIDE HYDROCHLORIDE 5 MG: 5 INJECTION INTRAMUSCULAR; INTRAVENOUS at 08:31

## 2022-05-31 RX ADMIN — GLYCOPYRROLATE 0.1 MG: 0.2 INJECTION, SOLUTION INTRAMUSCULAR; INTRAVENOUS at 08:29

## 2022-05-31 RX ADMIN — SODIUM CHLORIDE, POTASSIUM CHLORIDE, SODIUM LACTATE AND CALCIUM CHLORIDE: 600; 310; 30; 20 INJECTION, SOLUTION INTRAVENOUS at 08:27

## 2022-05-31 RX ADMIN — METOCLOPRAMIDE HYDROCHLORIDE 5 MG: 5 INJECTION INTRAMUSCULAR; INTRAVENOUS at 08:27

## 2022-05-31 RX ADMIN — PHENYLEPHRINE HYDROCHLORIDE 10 MCG/MIN: 10 INJECTION INTRAVENOUS at 09:09

## 2022-05-31 RX ADMIN — PROPOFOL 100 MCG/KG/MIN: 10 INJECTION, EMULSION INTRAVENOUS at 08:38

## 2022-05-31 RX ADMIN — FAMOTIDINE 20 MG: 20 TABLET ORAL at 08:50

## 2022-05-31 SDOH — SOCIAL STABILITY: SOCIAL INSECURITY: RISK FACTORS: HEART DISEASE

## 2022-05-31 SDOH — SOCIAL STABILITY: SOCIAL INSECURITY: RISK FACTORS: SLEEP APNEA

## 2022-05-31 ASSESSMENT — PAIN SCALES - GENERAL
PAINLEVEL_OUTOF10: 0
PAINLEVEL_OUTOF10: 4
PAINLEVEL_OUTOF10: 0
PAINLEVEL_OUTOF10: 4
PAINLEVEL_OUTOF10: 0

## 2022-06-03 ENCOUNTER — APPOINTMENT (OUTPATIENT)
Dept: CT IMAGING | Age: 52
End: 2022-06-03
Attending: EMERGENCY MEDICINE

## 2022-06-03 ENCOUNTER — HOSPITAL ENCOUNTER (EMERGENCY)
Age: 52
Discharge: HOME | End: 2022-06-03
Attending: EMERGENCY MEDICINE

## 2022-06-03 VITALS
RESPIRATION RATE: 19 BRPM | DIASTOLIC BLOOD PRESSURE: 75 MMHG | SYSTOLIC BLOOD PRESSURE: 117 MMHG | TEMPERATURE: 97 F | HEART RATE: 61 BPM | OXYGEN SATURATION: 95 %

## 2022-06-03 DIAGNOSIS — G89.29 CHRONIC NECK PAIN: ICD-10-CM

## 2022-06-03 DIAGNOSIS — H53.9 VISUAL CHANGES: Primary | ICD-10-CM

## 2022-06-03 DIAGNOSIS — M54.2 CHRONIC NECK PAIN: ICD-10-CM

## 2022-06-03 LAB
ALBUMIN SERPL-MCNC: 3.5 G/DL (ref 3.6–5.1)
ALBUMIN/GLOB SERPL: 0.9 {RATIO} (ref 1–2.4)
ALP SERPL-CCNC: 113 UNITS/L (ref 45–117)
ALT SERPL-CCNC: 59 UNITS/L
ANION GAP SERPL CALC-SCNC: 8 MMOL/L (ref 7–19)
AST SERPL-CCNC: 34 UNITS/L
ATRIAL RATE (BPM): 65
BASOPHILS # BLD: 0 K/MCL (ref 0–0.3)
BASOPHILS NFR BLD: 1 %
BILIRUB SERPL-MCNC: 0.7 MG/DL (ref 0.2–1)
BUN SERPL-MCNC: 14 MG/DL (ref 6–20)
BUN/CREAT SERPL: 13 (ref 7–25)
CALCIUM SERPL-MCNC: 9 MG/DL (ref 8.4–10.2)
CHLORIDE SERPL-SCNC: 107 MMOL/L (ref 97–110)
CO2 SERPL-SCNC: 28 MMOL/L (ref 21–32)
CREAT SERPL-MCNC: 1.04 MG/DL (ref 0.67–1.17)
DEPRECATED RDW RBC: 45.6 FL (ref 39–50)
EOSINOPHIL # BLD: 0.2 K/MCL (ref 0–0.5)
EOSINOPHIL NFR BLD: 4 %
ERYTHROCYTE [DISTWIDTH] IN BLOOD: 13.2 % (ref 11–15)
ERYTHROCYTE [SEDIMENTATION RATE] IN BLOOD BY WESTERGREN METHOD: 8 MM/HR (ref 0–20)
FASTING DURATION TIME PATIENT: ABNORMAL H
GFR SERPLBLD BASED ON 1.73 SQ M-ARVRAT: 87 ML/MIN
GLOBULIN SER-MCNC: 3.8 G/DL (ref 2–4)
GLUCOSE SERPL-MCNC: 134 MG/DL (ref 70–99)
HCT VFR BLD CALC: 40.8 % (ref 39–51)
HGB BLD-MCNC: 13.8 G/DL (ref 13–17)
IMM GRANULOCYTES # BLD AUTO: 0 K/MCL (ref 0–0.2)
IMM GRANULOCYTES # BLD: 0 %
INR PPP: 2.8
LYMPHOCYTES # BLD: 1 K/MCL (ref 1–4)
LYMPHOCYTES NFR BLD: 22 %
MAGNESIUM SERPL-MCNC: 2.2 MG/DL (ref 1.7–2.4)
MCH RBC QN AUTO: 32.2 PG (ref 26–34)
MCHC RBC AUTO-ENTMCNC: 33.8 G/DL (ref 32–36.5)
MCV RBC AUTO: 95.3 FL (ref 78–100)
MONOCYTES # BLD: 0.4 K/MCL (ref 0.3–0.9)
MONOCYTES NFR BLD: 10 %
NEUTROPHILS # BLD: 2.9 K/MCL (ref 1.8–7.7)
NEUTROPHILS NFR BLD: 63 %
NRBC BLD MANUAL-RTO: 0 /100 WBC
PLATELET # BLD AUTO: 152 K/MCL (ref 140–450)
POTASSIUM SERPL-SCNC: 3.7 MMOL/L (ref 3.4–5.1)
PROT SERPL-MCNC: 7.3 G/DL (ref 6.4–8.2)
PROTHROMBIN TIME: 28.5 SEC (ref 9.7–11.8)
QRS-INTERVAL (MSEC): 92
QT-INTERVAL (MSEC): 448
QTC: 466
R AXIS (DEGREES): -32
RBC # BLD: 4.28 MIL/MCL (ref 4.5–5.9)
REPORT TEXT: NORMAL
SODIUM SERPL-SCNC: 139 MMOL/L (ref 135–145)
T AXIS (DEGREES): 156
VENTRICULAR RATE EKG/MIN (BPM): 65
WBC # BLD: 4.5 K/MCL (ref 4.2–11)

## 2022-06-03 PROCEDURE — 70450 CT HEAD/BRAIN W/O DYE: CPT

## 2022-06-03 PROCEDURE — 72125 CT NECK SPINE W/O DYE: CPT

## 2022-06-03 PROCEDURE — G1004 CDSM NDSC: HCPCS

## 2022-06-03 PROCEDURE — 93010 ELECTROCARDIOGRAM REPORT: CPT | Performed by: INTERNAL MEDICINE

## 2022-06-03 PROCEDURE — 85025 COMPLETE CBC W/AUTO DIFF WBC: CPT | Performed by: EMERGENCY MEDICINE

## 2022-06-03 PROCEDURE — 85652 RBC SED RATE AUTOMATED: CPT | Performed by: EMERGENCY MEDICINE

## 2022-06-03 PROCEDURE — 93005 ELECTROCARDIOGRAM TRACING: CPT | Performed by: EMERGENCY MEDICINE

## 2022-06-03 PROCEDURE — 80053 COMPREHEN METABOLIC PANEL: CPT | Performed by: EMERGENCY MEDICINE

## 2022-06-03 PROCEDURE — 83735 ASSAY OF MAGNESIUM: CPT | Performed by: EMERGENCY MEDICINE

## 2022-06-03 PROCEDURE — 85610 PROTHROMBIN TIME: CPT | Performed by: EMERGENCY MEDICINE

## 2022-06-03 PROCEDURE — 99284 EMERGENCY DEPT VISIT MOD MDM: CPT

## 2022-06-03 PROCEDURE — 36415 COLL VENOUS BLD VENIPUNCTURE: CPT

## 2022-06-03 ASSESSMENT — ENCOUNTER SYMPTOMS
DIARRHEA: 0
BACK PAIN: 0
SHORTNESS OF BREATH: 0
NUMBNESS: 0
CONFUSION: 0
DIZZINESS: 0
CHEST TIGHTNESS: 0
FEVER: 0
ABDOMINAL PAIN: 0
EYE PAIN: 0
WEAKNESS: 0
SINUS PAIN: 0
WOUND: 0
VOMITING: 0
COUGH: 0

## 2022-09-17 ENCOUNTER — HOSPITAL ENCOUNTER (EMERGENCY)
Age: 52
Discharge: HOME | End: 2022-09-17
Attending: EMERGENCY MEDICINE

## 2022-09-17 VITALS
OXYGEN SATURATION: 97 % | HEART RATE: 68 BPM | BODY MASS INDEX: 24.9 KG/M2 | RESPIRATION RATE: 16 BRPM | SYSTOLIC BLOOD PRESSURE: 120 MMHG | WEIGHT: 149.47 LBS | TEMPERATURE: 98.1 F | HEIGHT: 65 IN | DIASTOLIC BLOOD PRESSURE: 88 MMHG

## 2022-09-17 DIAGNOSIS — R31.0 GROSS HEMATURIA: Primary | ICD-10-CM

## 2022-09-17 LAB
ANION GAP SERPL CALC-SCNC: 7 MMOL/L (ref 7–19)
APPEARANCE UR: ABNORMAL
BACTERIA #/AREA URNS HPF: ABNORMAL /HPF
BASOPHILS # BLD: 0 K/MCL (ref 0–0.3)
BASOPHILS NFR BLD: 1 %
BILIRUB UR QL STRIP: NEGATIVE
BUN SERPL-MCNC: 15 MG/DL (ref 6–20)
BUN/CREAT SERPL: 14 (ref 7–25)
CALCIUM SERPL-MCNC: 8.9 MG/DL (ref 8.4–10.2)
CHLORIDE SERPL-SCNC: 109 MMOL/L (ref 97–110)
CO2 SERPL-SCNC: 28 MMOL/L (ref 21–32)
COLOR UR: ABNORMAL
CREAT SERPL-MCNC: 1.06 MG/DL (ref 0.67–1.17)
DEPRECATED RDW RBC: 43.8 FL (ref 39–50)
EOSINOPHIL # BLD: 0.1 K/MCL (ref 0–0.5)
EOSINOPHIL NFR BLD: 2 %
ERYTHROCYTE [DISTWIDTH] IN BLOOD: 12.8 % (ref 11–15)
FASTING DURATION TIME PATIENT: NORMAL H
GFR SERPLBLD BASED ON 1.73 SQ M-ARVRAT: 84 ML/MIN
GLUCOSE SERPL-MCNC: 76 MG/DL (ref 70–99)
GLUCOSE UR STRIP-MCNC: 150 MG/DL
HCT VFR BLD CALC: 47.9 % (ref 39–51)
HGB BLD-MCNC: 16.3 G/DL (ref 13–17)
HGB UR QL STRIP: ABNORMAL
HYALINE CASTS #/AREA URNS LPF: ABNORMAL /LPF
IMM GRANULOCYTES # BLD AUTO: 0 K/MCL (ref 0–0.2)
IMM GRANULOCYTES # BLD: 0 %
INR PPP: 2.9
KETONES UR STRIP-MCNC: NEGATIVE MG/DL
LEUKOCYTE ESTERASE UR QL STRIP: NEGATIVE
LYMPHOCYTES # BLD: 1.6 K/MCL (ref 1–4)
LYMPHOCYTES NFR BLD: 44 %
MCH RBC QN AUTO: 31.9 PG (ref 26–34)
MCHC RBC AUTO-ENTMCNC: 34 G/DL (ref 32–36.5)
MCV RBC AUTO: 93.7 FL (ref 78–100)
MONOCYTES # BLD: 0.5 K/MCL (ref 0.3–0.9)
MONOCYTES NFR BLD: 13 %
NEUTROPHILS # BLD: 1.4 K/MCL (ref 1.8–7.7)
NEUTROPHILS NFR BLD: 40 %
NITRITE UR QL STRIP: NEGATIVE
NRBC BLD MANUAL-RTO: 0 /100 WBC
PH UR STRIP: 7 [PH] (ref 5–7)
PLATELET # BLD AUTO: 176 K/MCL (ref 140–450)
POTASSIUM SERPL-SCNC: 3.9 MMOL/L (ref 3.4–5.1)
PROT UR STRIP-MCNC: 30 MG/DL
PROTHROMBIN TIME: 29.5 SEC (ref 9.7–11.8)
RBC # BLD: 5.11 MIL/MCL (ref 4.5–5.9)
RBC #/AREA URNS HPF: >100 /HPF
SODIUM SERPL-SCNC: 140 MMOL/L (ref 135–145)
SP GR UR STRIP: 1.02 (ref 1–1.03)
SQUAMOUS #/AREA URNS HPF: ABNORMAL /HPF
UROBILINOGEN UR STRIP-MCNC: 0.2 MG/DL
WBC # BLD: 3.6 K/MCL (ref 4.2–11)
WBC #/AREA URNS HPF: ABNORMAL /HPF

## 2022-09-17 PROCEDURE — 85610 PROTHROMBIN TIME: CPT | Performed by: EMERGENCY MEDICINE

## 2022-09-17 PROCEDURE — 36415 COLL VENOUS BLD VENIPUNCTURE: CPT

## 2022-09-17 PROCEDURE — 81001 URINALYSIS AUTO W/SCOPE: CPT | Performed by: EMERGENCY MEDICINE

## 2022-09-17 PROCEDURE — 80048 BASIC METABOLIC PNL TOTAL CA: CPT | Performed by: EMERGENCY MEDICINE

## 2022-09-17 PROCEDURE — 99283 EMERGENCY DEPT VISIT LOW MDM: CPT

## 2022-09-17 PROCEDURE — 87086 URINE CULTURE/COLONY COUNT: CPT | Performed by: EMERGENCY MEDICINE

## 2022-09-17 PROCEDURE — 85025 COMPLETE CBC W/AUTO DIFF WBC: CPT | Performed by: EMERGENCY MEDICINE

## 2022-09-18 LAB
RAINBOW EXTRA TUBES HOLD SPECIMEN: NORMAL
RAINBOW EXTRA TUBES HOLD SPECIMEN: NORMAL

## 2022-09-19 LAB — BACTERIA UR CULT: NORMAL

## 2023-10-24 RX ORDER — ALPRAZOLAM 0.25 MG/1
0.25 TABLET ORAL NIGHTLY PRN
COMMUNITY

## 2023-11-17 ENCOUNTER — HOSPITAL ENCOUNTER (OUTPATIENT)
Facility: HOSPITAL | Age: 53
Setting detail: HOSPITAL OUTPATIENT SURGERY
Discharge: HOME | End: 2023-11-17
Attending: INTERNAL MEDICINE | Admitting: INTERNAL MEDICINE
Payer: COMMERCIAL

## 2023-11-17 ENCOUNTER — ANESTHESIA EVENT (OUTPATIENT)
Dept: ENDOSCOPY | Facility: HOSPITAL | Age: 53
End: 2023-11-17
Payer: COMMERCIAL

## 2023-11-17 ENCOUNTER — ANESTHESIA (OUTPATIENT)
Dept: ENDOSCOPY | Facility: HOSPITAL | Age: 53
End: 2023-11-17
Payer: COMMERCIAL

## 2023-11-17 VITALS
BODY MASS INDEX: 24.32 KG/M2 | WEIGHT: 146 LBS | HEART RATE: 62 BPM | SYSTOLIC BLOOD PRESSURE: 98 MMHG | DIASTOLIC BLOOD PRESSURE: 73 MMHG | RESPIRATION RATE: 15 BRPM | HEIGHT: 65 IN | OXYGEN SATURATION: 97 % | TEMPERATURE: 98 F

## 2023-11-17 LAB — INR: 3.3 (ref 0.8–1.3)

## 2023-11-17 PROCEDURE — 0DBL8ZX EXCISION OF TRANSVERSE COLON, VIA NATURAL OR ARTIFICIAL OPENING ENDOSCOPIC, DIAGNOSTIC: ICD-10-PCS | Performed by: INTERNAL MEDICINE

## 2023-11-17 PROCEDURE — 88305 TISSUE EXAM BY PATHOLOGIST: CPT | Performed by: INTERNAL MEDICINE

## 2023-11-17 RX ORDER — NALOXONE HYDROCHLORIDE 0.4 MG/ML
0.08 INJECTION, SOLUTION INTRAMUSCULAR; INTRAVENOUS; SUBCUTANEOUS ONCE AS NEEDED
Status: DISCONTINUED | OUTPATIENT
Start: 2023-11-17 | End: 2023-11-17

## 2023-11-17 RX ORDER — SODIUM CHLORIDE, SODIUM LACTATE, POTASSIUM CHLORIDE, CALCIUM CHLORIDE 600; 310; 30; 20 MG/100ML; MG/100ML; MG/100ML; MG/100ML
INJECTION, SOLUTION INTRAVENOUS CONTINUOUS
Status: DISCONTINUED | OUTPATIENT
Start: 2023-11-17 | End: 2023-11-17

## 2023-11-17 RX ORDER — BRIVARACETAM 100 MG/1
100 TABLET, FILM COATED ORAL 2 TIMES DAILY
COMMUNITY

## 2023-11-17 RX ADMIN — SODIUM CHLORIDE, SODIUM LACTATE, POTASSIUM CHLORIDE, CALCIUM CHLORIDE: 600; 310; 30; 20 INJECTION, SOLUTION INTRAVENOUS at 13:51:00

## 2023-11-17 NOTE — ANESTHESIA POSTPROCEDURE EVALUATION
[facility] Patient Status:  Hospital Outpatient Surgery   Age/Gender 48year old male MRN [id number]   Location [address] [facility] 28 Attending No att. providers found   Hosp Day # 0 PCP Katelyn Calderon MD       Anesthesia Post-op Note    COLONOSCOPY with cold snare polypectomy    Procedure Summary       Date: 11/17/23 Room / Location: [facility] ENDOSCOPY 03 / 1404 [facility] ENDOSCOPY    Anesthesia Start: 5355 Anesthesia Stop: 5433    Procedure: COLONOSCOPY with cold snare polypectomy Diagnosis: (polyp, diverticulosis , hemorrhoids)    Surgeons: Estelle Valle MD Anesthesiologist: Nishant Carvajal DO    Anesthesia Type: MAC ASA Status: 3            Anesthesia Type: No value filed. Vitals Value Taken Time   BP 98/73 11/17/23 1439   Temp  11/17/23 1640   Pulse 62 11/17/23 1439   Resp  11/17/23 1640   SpO2 97 % 11/17/23 1439       Patient Location: Endoscopy    Anesthesia Type: MAC    Airway Patency: patent    Postop Pain Control: adequate    Mental Status: mildly sedated but able to meaningfully participate in the post-anesthesia evaluation    Nausea/Vomiting: none    Cardiopulmonary/Hydration status: stable euvolemic    Complications: no apparent anesthesia related complications    Postop vital signs: stable    Dental Exam: Unchanged from Preop    Patient to be discharged home when criteria met.

## 2023-11-17 NOTE — OPERATIVE REPORT
Colonoscopy Operative Report    Jonny Miguel Patient Status:  [facility] Outpatient Surgery    1970 MRN [id number]   Location [address] [address] Attending Isabel Remy MD   Hosp Day #   0 PCP Milagros Henderson MD     Pre-Operative Diagnosis: FHx COLON CANCER; PHx COLON POLYPS    Post-Operative Diagnosis:  4 mm sessile transverse colon polyp removed with a cold snare. Sigmoid diverticulosis. Hemorrhoids      Procedure Performed: COLONOSCOPY with snare polypectomy    Informed Consent: Informed consent for both the procedure and sedation were obtained from the patient. The potentially life-threatening complications of sedation, bleeding,  perforation, transfusion or repeat endoscopy  were reviewed along with the possible need for hospitalization, surgical management, transfusion or repeat endoscopy should one of these complications arise. The patient understands and is agreeable to proceed. Sedation Type: MAC-Patient received sedation with monitored anesthesia provided by an anesthesiologist  Moderate Sedation Time: None. Deep sedation provided by anesthesia. Cecum Withdrawal Time:  8 min  Date of previous colonoscopy:     Procedure Description: The patient was placed in the left lateral decubitus position. After careful digital rectal examination, the Adult colonoscope was inserted into the rectum and advanced to the level of the cecum under direct visualization. The cecum was identified by landmarks, including the appendiceal orifice and ileoceccal valve. Careful examination of the entire colon was performed during withdrawal of the endoscope. The scope was withdrawn to the rectum and retroflexion was performed. The patient tolerated the procedure well with no immediate complications. The patient was transferred to the recovery area in stable condition.   Quality of Preparation: Adequate  Aronchick Bowel Prep Scale:  1 - excellent  Findings:   4 mm sessile transverse colon polyp removed with a cold snare. Sigmoid diverticulosis. Hemorrhoids      Recommendations:  Await pathology results. Further recommendations with regards to when the repeat the colonoscopy will be based on these results. Discharge: The patient was given an after visit summary detailing the procedure, findings, recommendations and follow up plans.      Nuria Dunlap MD  11/17/2023  1:26 PM

## 2023-11-17 NOTE — DISCHARGE INSTRUCTIONS
FINDINGS:  1.  Just 1 small polyp was found and removed. 2.  There were small pockets in the colon called diverticulosis. These are considered normal to have. So long as you have not had any complications with these pockets such as significant rectal bleeding or infections called diverticulitis, then there is nothing recommended that you need to do. PLAN:  1. Await the biopsy results. If you have any questions, please call us at [phone]. Thank you,  Arvis Siemens, MD    Home Care Instructions for Colonoscopy  with Sedation    Diet:  - Resume your regular diet as tolerated unless otherwise instructed. - Start with light meals to minimize bloating.  - Do not drink alcohol today. Medication:  - If you have questions about resuming your normal medications, please contact your Primary Care Physician. Activities:  - Take it easy today. Do not return to work today. - Do not drive today. - Do not operate any machinery today (including kitchen equipment). Colonoscopy:  - You may notice some rectal \"spotting\" (a little blood on the toilet tissue) for a day or two after the exam. This is normal.  - If you experience any rectal bleeding (not spotting), persistent tenderness or sharp severe abdominal pains, oral temperature over 100 degrees Fahrenheit, light-headedness or dizziness, or any other problems, contact your doctor. **If unable to reach your doctor, please go to the [facility] Emergency Room**    - Your referring physician will receive a full report of your examination.  - If you do not hear from your doctor's office within two weeks of your biopsy, please call them for your results.

## 2023-11-17 NOTE — H&P
History and Physical for Endoscopic Procedure      Naima Najera Patient Status:  Hospital Outpatient Surgery    1970 MRN [id number]   Location [address] [address] Attending Dora Tucker MD   Hosp Day # 0 PCP Toribio Ames MD     Date of Consult:  23    Reason for Consultation:  History of rectal cancer    History of Present Illness:  Naima Najera is a a(n) 48year old male.      History:  Past Medical History:   Diagnosis Date    Anesthesia complication     wakes up very angry after anesthesia - states he is okay with propofol    Anxiety state     Arm DVT (deep venous thromboembolism), acute, left ([address] 2021    Atrial fibrillation, unspecified type ([address] 2016    Blood disorder     Calculus of kidney     Cardiac defibrillator in place     Cardiomyopathy [facility])     Depression     Esophageal cancer ([address] 2021    Esophageal reflux     Family history of colon cancer 2018    Gout     Hematuria, gross 2014    High blood pressure     High cholesterol     HOCM (hypertrophic obstructive cardiomyopathy) ([address] 2014    Hypercoagulable state ([address] 2013    Lactose intolerance 2015    Malignant neoplasm of lower third of esophagus ([address] 2021    MI (myocardial infarction) ([address]     per ng @age 31    Other and unspecified hyperlipidemia     Other hypertrophic cardiomyopathy ([address]     Personal history of other diseases of circulatory system     Personal history of venous thrombosis and embolism 2013    TPA / residual expressie aphasia     Reflux     Renal calculi 2014    Seizure disorder ([address] 2015    Sleep apnea     CPAP    Stroke ([address] 2013    speech deficit    Unspecified essential hypertension     Ventricular arrhythmia      defibrillator     Visual impairment      Past Surgical History:   Procedure Laterality Date    CARDIAC DEFIBRILLATOR PLACEMENT      COLONOSCOPY N/A 2018    Procedure: COLONOSCOPY, POSSIBLE BIOPSY, POSSIBLE POLYPECTOMY 01649;  Surgeon: Bennie Vital MD;  Location: [address] [place] [address]    EGD N/A 1/22/2021    Procedure: ESOPHAGOGASTRODUODENOSCOPY, COLONOSCOPY, POSSIBLE BIOPSY, POSSIBLE POLYPECTOMY 10939, 15873;  Surgeon: Bennie Vital MD;  Location: [address] [place] [address]    HERNIA SURGERY      repair     OTHER SURGICAL HISTORY  01/28/15    Cystoscopy - Dr. Trey Lewis    PACEMAKER/DEFIBRILLATOR       Family History   Problem Relation Age of Onset    Cancer Father     Diabetes Father     Heart Attack Father     Hypertension Father     Cancer Mother     Heart Disease Mother         CAD    Hypertension Mother     Diabetes Mother       reports that he quit smoking about 10 years ago. His smoking use included cigarettes. He has a 22.00 pack-year smoking history. He has never used smokeless tobacco. He reports that he does not drink alcohol and does not use drugs. Allergies:   Allergies   Allergen Reactions    Penicillins HIVES     Other reaction(s): PENICILLINS  Other reaction(s): PENICILLINS    Meloxicam      Other reaction(s): Chest Pains       Medications:    Current Facility-Administered Medications:     lactated ringers infusion, , Intravenous, Continuous    Review of Systems:  Gastrointestinal: negative other than specified in the HPI  General: negative other than specified in the HPI  Neurological: negative other than specified in the HPI  Cardiovascular: negative other than specified in the HPI  Respiratory: negative other than specified in the HPI    Physical Exam:  No acute distress  RRR  CTA B/L  SOFT +BS    Assessment/Plan:  Patient Active Problem List   Diagnosis    Renal calculi    Hypercoagulable state ([facility] [address]    HOCM (hypertrophic obstructive cardiomyopathy) ([facility] [address]    History of CVA (cerebrovascular accident)    ICD (implantable cardioverter-defibrillator) [employer]    Lactose intolerance    Repair Rt distal bicep tendon global 2-28-16    Atrial fibrillation, unspecified type ([facility] [address]    Prothrombin gene mutation (CODE)    Seizure disorder ([facility] [address]    S/P tendon repair    Family history of colon cancer    JOE on CPAP    Seizure (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 [address]    H/O angioplasty       Colonoscopy today    Merly Lora MD  11/17/2023  1:25 PM

## 2025-06-09 RX ORDER — MULTIVIT WITH MINERALS/LUTEIN
500 TABLET ORAL DAILY
COMMUNITY

## 2025-06-09 RX ORDER — FERROUS SULFATE 324(65)MG
TABLET, DELAYED RELEASE (ENTERIC COATED) ORAL DAILY
COMMUNITY

## 2025-06-09 RX ORDER — LEVETIRACETAM 500 MG/1
500 TABLET ORAL 2 TIMES DAILY
COMMUNITY

## 2025-06-18 ENCOUNTER — HOSPITAL ENCOUNTER (OUTPATIENT)
Facility: HOSPITAL | Age: 55
Setting detail: HOSPITAL OUTPATIENT SURGERY
Discharge: HOME | End: 2025-06-18
Attending: INTERNAL MEDICINE | Admitting: INTERNAL MEDICINE
Payer: COMMERCIAL

## 2025-06-18 ENCOUNTER — ANESTHESIA EVENT (OUTPATIENT)
Dept: ENDOSCOPY | Facility: HOSPITAL | Age: 55
End: 2025-06-18
Payer: COMMERCIAL

## 2025-06-18 ENCOUNTER — ANESTHESIA (OUTPATIENT)
Dept: ENDOSCOPY | Facility: HOSPITAL | Age: 55
End: 2025-06-18
Payer: COMMERCIAL

## 2025-06-18 VITALS
HEIGHT: 65 IN | HEART RATE: 55 BPM | OXYGEN SATURATION: 95 % | RESPIRATION RATE: 18 BRPM | TEMPERATURE: 97 F | BODY MASS INDEX: 24.99 KG/M2 | DIASTOLIC BLOOD PRESSURE: 65 MMHG | SYSTOLIC BLOOD PRESSURE: 105 MMHG | WEIGHT: 150 LBS

## 2025-06-18 LAB — INR: 1.4 (ref 0.8–1.3)

## 2025-06-18 PROCEDURE — 88305 TISSUE EXAM BY PATHOLOGIST: CPT | Performed by: INTERNAL MEDICINE

## 2025-06-18 DEVICE — REPLAY HEMOSTASIS CLIP, 11MM SPAN
Type: IMPLANTABLE DEVICE | Site: COLON | Status: FUNCTIONAL
Brand: REPLAY

## 2025-06-18 RX ORDER — SODIUM CHLORIDE, SODIUM LACTATE, POTASSIUM CHLORIDE, CALCIUM CHLORIDE 600; 310; 30; 20 MG/100ML; MG/100ML; MG/100ML; MG/100ML
INJECTION, SOLUTION INTRAVENOUS CONTINUOUS
Status: DISCONTINUED | OUTPATIENT
Start: 2025-06-18 | End: 2025-06-18

## 2025-06-18 RX ORDER — LIDOCAINE HYDROCHLORIDE 10 MG/ML
INJECTION, SOLUTION EPIDURAL; INFILTRATION; INTRACAUDAL; PERINEURAL AS NEEDED
Status: DISCONTINUED | OUTPATIENT
Start: 2025-06-18 | End: 2025-06-18 | Stop reason: SURG

## 2025-06-18 RX ADMIN — LIDOCAINE HYDROCHLORIDE 20 MG: 10 INJECTION, SOLUTION EPIDURAL; INFILTRATION; INTRACAUDAL; PERINEURAL at 10:30:00

## 2025-06-18 RX ADMIN — SODIUM CHLORIDE, SODIUM LACTATE, POTASSIUM CHLORIDE, CALCIUM CHLORIDE: 600; 310; 30; 20 INJECTION, SOLUTION INTRAVENOUS at 11:02:00

## 2025-06-18 RX ADMIN — SODIUM CHLORIDE, SODIUM LACTATE, POTASSIUM CHLORIDE, CALCIUM CHLORIDE: 600; 310; 30; 20 INJECTION, SOLUTION INTRAVENOUS at 10:28:00

## 2025-06-18 NOTE — ANESTHESIA PREPROCEDURE EVALUATION
PRE-OP EVALUATION    Patient Name: Ector Faria    Admit Diagnosis: IRON DEFICIENCY ANEMIA    Pre-op Diagnosis: IRON DEFICIENCY ANEMIA    ESOPHAGOGASTRODUODENOSCOPY, and   COLONOSCOPY    Anesthesia Procedure: ESOPHAGOGASTRODUODENOSCOPY, and  COLONOSCOPY  colonoscopy    Surgeons and Role:     * John Daigle MD - Primary    Pre-op vitals reviewed.  Temp: 97 °F (36.1 °C)  Pulse: 54  BP: 130/75  SpO2: 100 %  Body mass index is 24.96 kg/m².    Current medications reviewed.  Hospital Medications:  Current Medications[1]    Outpatient Medications:   Prescriptions Prior to Admission[2]    Allergies: Penicillins and Meloxicam      Anesthesia Evaluation    Patient summary reviewed.    Anesthetic Complications  (+) history of anesthetic complications         GI/Hepatic/Renal      (+) GERD                           Cardiovascular  Comment: Summary:     1. Left ventricle: The cavity size is normal. There is mild concentric      hypertrophy. Systolic function is normal. The estimated ejection fraction      is 55-60%. Wall motion is normal; there are no regional wall motion      abnormalities. There is diastolic dysfunction. Septal hypertrophy is      noted without dynamic obstruction.   2. Aortic valve: Thickening, consistent with sclerosis.   3. Mitral valve: There is mild regurgitation.   4. Left atrium: The atrium is moderately dilated.   5. Right ventricle: Estimation of the right ventricular systolic pressure is      within the normal range.   6. Right atrium: The atrium is mildly dilated.   7. Tricuspid valve: There is mild regurgitation.   8. Pericardium, extracardiac: There is no significant pericardial effusion.     Impressions:  No previous study was available for comparison.     Prepared and signed by   Raz Duffy   03/31/2021 14:06         Exercise tolerance: good     MET: >4      (+) hypertension           (+) pacemaker/AICD       (+) dysrhythmias and atrial fibrillation                   Endo/Other    Negative endo/other ROS.                              Pulmonary                    (+) sleep apnea and CPAP      Neuro/Psych      (+) depression    (+) CVA                            Past Surgical History[3]  Social Hx on file[4]  History   Drug Use No     Available pre-op labs reviewed.        Lab Results   Component Value Date    INR 1.4 (A) 06/18/2025         Airway      Mallampati: III  Mouth opening: >3 FB  TM distance: > 6 cm  Neck ROM: full Cardiovascular    Cardiovascular exam normal.         Dental    Dentition appears grossly intact         Pulmonary    Pulmonary exam normal.                 Other findings              ASA: 3   Plan: MAC  NPO status verified and patient meets guidelines.    Post-procedure pain management plan discussed with surgeon and patient.    Comment:     A detailed discussion about the anesthetic plan was held with Ector Faria in the preoperative area. Benefits and risks of MAC anesthesia were discussed, including intraoperative awareness/recall, PONV, reasonable expectations of post-operative pain/discomfort, aspiration, conversion to general anesthesia, dental injury, pressure/nerve injuries from positioning, and other serious but rare complications (life-threatening cardiopulmonary events). All questions were answered appropriately and patient demonstrated understanding of realistic expectations and risks of undergoing anesthesia. Ector Faria consents to receiving anesthesia and wishes to proceed.      Plan/risks discussed with: patient                Present on Admission:  **None**             [1]    lactated ringers infusion   Intravenous Continuous   [2]   Medications Prior to Admission   Medication Sig Dispense Refill Last Dose/Taking    levETIRAcetam 500 MG Oral Tab Take 1 tablet (500 mg total) by mouth 2 (two) times daily.   6/18/2025 Morning    Ferrous Sulfate 324 (65 Fe) MG Oral Tab EC Take by mouth daily.   6/18/2025 Morning    ascorbic acid  250 MG Oral Tab Take 2 tablets (500 mg total) by mouth daily. gummies   Taking    ALPRAZolam 0.25 MG Oral Tab Take 1 tablet (0.25 mg total) by mouth nightly as needed. 1/2 tab   6/17/2025    traZODone 25 mg Oral Tab Take 1 Partial Tablet (25 mg total) by mouth nightly.   6/17/2025    sertraline 25 MG Oral Tab Take 1 tablet (25 mg total) by mouth daily. 30 tablet 1 6/18/2025 Morning    sotalol 120 MG Oral Tab Take 1 tablet (120 mg total) by mouth 2 (two) times daily. 180 tablet 3 6/18/2025 Morning    simvastatin 20 MG Oral Tab Take 1 tablet (20 mg total) by mouth every evening. 90 tablet 3 6/17/2025    WARFARIN 3 MG Oral Tab TAKE 1 TABLET BY MOUTH EVERY EVENING OR AS DIRECTED BY COUMADIN CLINIC (Patient taking differently: TAKE 1 TABLET BY MOUTH EVERY EVENING OR AS DIRECTED BY COUMADIN CLINIC  1.5mg po on Fridays) 90 tablet 0 6/12/2025    metoprolol succinate 25 MG Oral Tablet 24 Hr Take 2 tablets (50 mg total) by mouth daily. (Patient taking differently: Take 2 tablets (50 mg total) by mouth in the morning and 2 tablets (50 mg total) before bedtime.) 180 tablet 1 6/18/2025 Morning    dilTIAZem 240 MG Oral Capsule SR 24 Hr Take 1 capsule (240 mg total) by mouth daily. 30 capsule 11 6/18/2025 Morning    OMEPRAZOLE 40 MG Oral Capsule Delayed Release TAKE 1 CAPSULE(40 MG) BY MOUTH TWICE DAILY BEFORE MEALS 60 capsule 11 6/18/2025 Morning   [3]   Past Surgical History:  Procedure Laterality Date    Cardiac defibrillator placement      Colonoscopy N/A 9/17/2018    Procedure: COLONOSCOPY, POSSIBLE BIOPSY, POSSIBLE POLYPECTOMY 11725;  Surgeon: John Daigle MD;  Location: [facility]    Colonoscopy N/A 11/17/2023    Procedure: COLONOSCOPY with cold snare polypectomy;  Surgeon: John Daigle MD;  Location:  ENDOSCOPY    Egd N/A 1/22/2021    Procedure: ESOPHAGOGASTRODUODENOSCOPY, COLONOSCOPY, POSSIBLE BIOPSY, POSSIBLE POLYPECTOMY 70586, 23438;  Surgeon: John Daigle MD;  Location: [facility]    Hernia  surgery      repair     Other surgical history  01/28/15    Cystoscopy - Dr. De La Cruz    Pacemaker/defibrillator     [4]   Social History  Socioeconomic History    Marital status:    Tobacco Use    Smoking status: Former     Current packs/day: 0.00     Average packs/day: 1 pack/day for 22.0 years (22.0 ttl pk-yrs)     Types: Cigarettes     Start date: 1991     Quit date: 2013     Years since quittin.9    Smokeless tobacco: Never    Tobacco comments:     uses e-cigarettes sometimes   Vaping Use    Vaping status: Some Days    Substances: Nicotine    Devices: Disposable   Substance and Sexual Activity    Alcohol use: No     Alcohol/week: 0.0 standard drinks of alcohol    Drug use: No    Sexual activity: Yes   Other Topics Concern    Caffeine Concern Yes     Comment: one cup soda daily

## 2025-06-18 NOTE — H&P
History and Physical for Endoscopic Procedure      Ector Faria Patient Status:  Hospital Outpatient Surgery    1970 MRN [id number]   [facility] ENDOSCOPY [facility]IN CENTER Attending John Daigle MD   Hosp Day # 0 PCP Misha Sahni MD     Date of Consult:  25    Reason for Consultation:  Hx of rectal cancer   Hx of esophageal cancer     History of Present Illness:  Ector Faria is a a(n) 54 year old male.     History:  Past Medical History[1]  Past Surgical History[2]  Family History[3]   reports that he quit smoking about 11 years ago. His smoking use included cigarettes. He started smoking about 33 years ago. He has a 22 pack-year smoking history. He has never used smokeless tobacco. He reports that he does not drink alcohol and does not use drugs.    Allergies:  Allergies[4]    Medications:  Current Hospital Medications[5]    Review of Systems:  Gastrointestinal: negative other than specified in the HPI  General: negative other than specified in the HPI  Neurological: negative other than specified in the HPI  Cardiovascular: negative other than specified in the HPI  Respiratory: negative other than specified in the HPI    Physical Exam:  No acute distress  RRR  CTA B/L  SOFT +BS    Assessment/Plan:  Problem List[6]    EGD/colonoscopy today.    John Daigle MD  2025  9:41 AM         [1]   Past Medical History:   Anesthesia complication    wakes up very angry after general anesthesia - states he is okay with propofol    Anxiety state    Arm DVT (deep venous thromboembolism), acute, left (HCC)    Atrial fibrillation, unspecified type (HCC)    Blood disorder    Calculus of kidney    Cardiac defibrillator in place    Cardiomyopathy (HCC)    Depression    Esophageal cancer (HCC)    Esophageal reflux    Family history of colon cancer    Gout    Hematuria, gross    High blood pressure    High cholesterol    History of adverse reaction to anesthesia    pt states, aggressive  with general anesthesia    HOCM (hypertrophic obstructive cardiomyopathy) (HCC)    Hx of motion sickness    Hypercoagulable state (HCC)    Lactose intolerance    Malignant neoplasm of lower third of esophagus (HCC)    MI (myocardial infarction) (HCC)    per ng @age 31    Other and unspecified hyperlipidemia    Other hypertrophic cardiomyopathy (HCC)    Personal history of other diseases of circulatory system    Personal history of venous thrombosis and embolism    TPA / residual expressie aphasia     Reflux    Renal calculi    Seizure disorder (HCC)    x3    Sleep apnea    CPAP    Stroke (HCC)    speech deficit; r sided weakness    Unspecified essential hypertension    Ventricular arrhythmia     defibrillator     Visual impairment   [2]   Past Surgical History:  Procedure Laterality Date    Cardiac defibrillator placement      Colonoscopy N/A 9/17/2018    Procedure: COLONOSCOPY, POSSIBLE BIOPSY, POSSIBLE POLYPECTOMY 03944;  Surgeon: John Daigle MD;  Location: [facility]    Colonoscopy N/A 11/17/2023    Procedure: COLONOSCOPY with cold snare polypectomy;  Surgeon: John Daigle MD;  Location:  ENDOSCOPY    Egd N/A 1/22/2021    Procedure: ESOPHAGOGASTRODUODENOSCOPY, COLONOSCOPY, POSSIBLE BIOPSY, POSSIBLE POLYPECTOMY 90461, 53010;  Surgeon: John Daigle MD;  Location: [facility]    Hernia surgery      repair     Other surgical history  01/28/15    Cystoscopy - Dr. De La Cruz    Pacemaker/defibrillator     [3]   Family History  Problem Relation Age of Onset    Cancer Father     Diabetes Father     Heart Attack Father     Hypertension Father     Cancer Mother     Heart Disease Mother         CAD    Hypertension Mother     Diabetes Mother    [4]   Allergies  Allergen Reactions    Penicillins HIVES     Other reaction(s): PENICILLINS  Other reaction(s): PENICILLINS    Meloxicam OTHER (SEE COMMENTS)     Other reaction(s): Chest Pains    6/9/25- patient unsure of reaction   [5]   Current  Facility-Administered Medications:     lactated ringers infusion, , Intravenous, Continuous  [6]   Patient Active Problem List  Diagnosis    Renal calculi    Hypercoagulable state (HCC)    HOCM (hypertrophic obstructive cardiomyopathy) (HCC)    History of CVA (cerebrovascular accident)    ICD (implantable cardioverter-defibrillator) [place] [employer]    Lactose intolerance    Repair Rt distal bicep tendon global 2-28-16    Atrial fibrillation, unspecified type (HCC)    Prothrombin gene mutation (CODE)    Seizure disorder (HCC)    S/P tendon repair    Family history of colon cancer    JOE on CPAP    Seizure (HCC)    Dizzy    Vertigo    Vestibular dysfunction of left ear    Vestibular dysfunction, unspecified laterality    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

## 2025-06-18 NOTE — DISCHARGE INSTRUCTIONS
FINDINGS:  1.  There were erosions (mini ulcers) in your stomach and this was biopsied.    2.  In the colon, there was 1 polyp removed.      PLAN:  1.  Await the biopsy results.    2.  Start Omeprazole 40 mg daily for the ulcers.  This was called to your pharmacy.      If you have any questions, please call us at [phone].    Thank you,  John Daigle MD      Home Care Instructions for Colonoscopy and/or Gastroscopy with Sedation    Diet:  - Resume your regular diet as tolerated unless otherwise instructed.  - Start with light meals to minimize bloating.  - Do not drink alcohol today.    Medication:  - If you have questions about resuming your normal medications, please contact your Primary Care Physician.    Activities:  - Take it easy today. Do not return to work today.  - Do not drive today.  - Do not operate any machinery today (including kitchen equipment).    Colonoscopy:  - You may notice some rectal \"spotting\" (a little blood on the toilet tissue) for a day or two after the exam. This is normal.  - If you experience any rectal bleeding (not spotting), persistent tenderness or sharp severe abdominal pains, oral temperature over 100 degrees Fahrenheit, light-headedness or dizziness, or any other problems, contact your doctor.    Gastroscopy:  - You may have a sore throat for 2-3 days following the exam. This is normal. Gargling with warm salt water (1/2 tsp salt to 1 glass warm water) or using throat lozenges will help.  - If you experience any sharp pain in your neck, abdomen or chest, vomiting of blood, oral temperature over 100 degrees Fahrenheit, light-headedness or dizziness, or any other problems, contact your doctor.    **If unable to reach your doctor, please go to the [facility] Emergency Room**    - Your referring physician will receive a full report of your examination.  - If you do not hear from your doctor's office within two weeks of your biopsy, please call them for your results.    You  may be able to see your laboratory results in [facility] between 4 and 7 business days.  In some cases, your physician may not have viewed the results before they are released to [facility].  If you have questions regarding your results contact the physician who ordered the test/exam by phone or via [facility] by choosing \"Ask a Medical Question.\"

## 2025-06-18 NOTE — OPERATIVE REPORT
EGD/Colonoscopy Operative Report    Ector Faria Patient Status:  [facility] Outpatient Surgery    1970 MRN [id number]   [facility] ENDOSCOPY PAIN CENTER Attending John Daigle MD   Hosp Day #   0 PCP Misha Sahni MD     Pre-Operative Diagnosis: IRON DEFICIENCY ANEMIA, hx of rectal cancer, hx of esophageal cancer    Post-Operative Diagnosis:    ESOPHAGUS:  proximal esophagus is normal.  Then there is the gastro-esophageal anastomosis with s/p distal esophageal resection.   STOMACH:  at the gastric side of the anastomosis is a small nodule removed with a cold biopsy forceps.  In the antrum were 4 erosions and this was biopsied.   DUODENUM:  normal.  Biopsied.   COLON:      1.  6 mm pedunculated sigmoid polyp at 20 cm from the anus.  This was removed with a cold snare at the base of the stalk.  The post polypectomy site was closed with 2 endoclips.    2.  Hemorrhoids      Procedure Performed:   EGD with biopsy  COLONOSCOPY with snare polypectomy  Informed Consent: Informed consent for both the procedure and sedation were obtained from the patient. The potentially life-threatening complications of sedation, bleeding,  perforation, transfusion or repeat endoscopy were reviewed along with the possible need for hospitalization, surgical management, transfusion or repeat endoscopy should one of these complications arise. The patient understands and is agreeable to proceed.  Sedation Type: MAC-Patient received sedation with monitored anesthesia provided by an anesthesiologist    Moderate Sedation Time: None.  Deep sedation provided by anesthesia.    Cecum Withdrawal Time:  14 min    Date of previous colonoscopy:     Procedure Description: The patient was placed in the left lateral decubitus position. A bite block was placed in the patient’s mouth.  The endoscope was inserted through the mouth and advanced under direct visualization to the 3rd portion of the duodenum and was then withdrawn to examine the duodenal bulb and gastric antrum.  The endoscope was then retroflexed to examine the angulus, GE junction, cardia, body and fundus,  then withdrawn to examine the esophagus. The endoscope was then removed from the patient. The patient tolerated the procedure well with no immediate complications. The patient was then repositioned for colonoscopy.  After careful digital rectal examination, the Adult colonoscope was inserted into the rectum and advanced to the level of the cecum under direct visualization. The cecum was identified by landmarks, including the appendiceal orifice and ileoceccal valve. Careful examination of the entire colon was performed during withdrawal of the endoscope. The scope was withdrawn to the rectum and retroflexion was performed.  The patient tolerated the procedure well with no immediate complications. The patient was transferred to the recovery area in stable condition.   Quality of Preparation: Adequate  Aronchick Bowel Prep Scale:  2 - good    Findings:    ESOPHAGUS:  proximal esophagus is normal.  Then there is the gastro-esophageal anastomosis with s/p distal esophageal resection.   STOMACH:  at the gastric side of the anastomosis is a small nodule removed with a cold biopsy forceps.  In the antrum were 4 erosions and this was biopsied.   DUODENUM:  normal.  Biopsied.   COLON:      1.  6 mm pedunculated sigmoid polyp at 20 cm from the anus.  This was removed with a cold snare at the base of the stalk.  The post polypectomy site was closed with 2 endoclips.    2.  Hemorrhoids    Recommendations:   Await pathology  Start omeprazole 40 mg daily.    Discharge:  The patient was given an after visit summary detailing the procedure, findings, recommendations and follow up plans.  John Daigle MD  6/18/2025  9:41 AM

## 2025-06-18 NOTE — ANESTHESIA POSTPROCEDURE EVALUATION
[facility]    Ector Faria Patient Status:  Hospital Outpatient Surgery   Age/Gender 54 year old male MRN [id number]   Location [facility] ENDOSCOPY PAIN CENTER Attending John Daigle MD   Hosp Day # 0 PCP Misha Sahni MD       Anesthesia Post-op Note    ESOPHAGOGASTRODUODENOSCOPY with biopsies, and   COLONOSCOPY with cold snare polypectomy and endoclipping x 2    Procedure Summary       Date: 06/18/25 Room / Location:  ENDOSCOPY 04 /  ENDOSCOPY    Anesthesia Start: 1028 Anesthesia Stop: 1102    Procedures:       ESOPHAGOGASTRODUODENOSCOPY with biopsies, and  COLONOSCOPY with cold snare polypectomy and endoclipping x 2      colonoscopy Diagnosis: (EGD: gastric nodule, gastric erosion ; COLON: polyps, hemorrhoids)    Surgeons: John Daigle MD Anesthesiologist: Roseanna Payton MD    Anesthesia Type: MAC ASA Status: 3            Anesthesia Type: MAC    Vitals Value Taken Time   /60 06/18/25 11:06   Temp 97 06/18/25 11:08   Pulse 51 06/18/25 11:07   Resp 18 06/18/25 11:00   SpO2 92 % 06/18/25 11:07   Vitals shown include unfiled device data.        Patient Location: Endoscopy    Anesthesia Type: MAC    Airway Patency: patent    Postop Pain Control: adequate    Mental Status: mildly sedated but able to meaningfully participate in the post-anesthesia evaluation    Nausea/Vomiting: none    Cardiopulmonary/Hydration status: stable euvolemic    Complications: no apparent anesthesia related complications    Postop vital signs: stable    Dental Exam: Unchanged from Preop    Patient to be discharged from PACU when criteria met.

## (undated) DEVICE — SHEATH 12FR 65CM GUIDE FLEXCATH ADV STRL LF DISP

## (undated) DEVICE — KIT ELECTRODE SRFC ENSITE DISP NS

## (undated) DEVICE — Device

## (undated) DEVICE — CABLE CRYO ELC UMB STRL LF DISP

## (undated) DEVICE — GIJAW SINGLE-USE BIOPSY FORCEPS WITH NEEDLE: Brand: GIJAW

## (undated) DEVICE — CONMED SCOPE SAVER BITE BLOCK, 20X27 MM: Brand: SCOPE SAVER

## (undated) DEVICE — RETRIEVAL DEVICE: Brand: RESCUENET™

## (undated) DEVICE — REM POLYHESIVE ADULT PATIENT RETURN ELECTRODE: Brand: VALLEYLAB

## (undated) DEVICE — CATH GOLD PROBE HEMOGLIDE 7FR

## (undated) DEVICE — CATHETER DUODECA STRBL 7FR 5MM SPACE SUP LG CURL 95CM LVWR

## (undated) DEVICE — DRESSING GAUZE 1.5X1.5IN HMST QUIKCLOT

## (undated) DEVICE — PAD EG 15SQ IN LG NONSPLIT PRECORD PLATE PLUG 9100 SERIES

## (undated) DEVICE — FORCEP RADIAL JAW 4

## (undated) DEVICE — GLOVE SURG 7.5 PROTEXIS LF CRM PF SMTH BEAD CUFF STRL

## (undated) DEVICE — TRAP POLYP W/ 2 SPEC TY CLR MAGNIFYING WIND

## (undated) DEVICE — Device: Brand: DEFENDO AIR/WATER/SUCTION AND BIOPSY VALVE

## (undated) DEVICE — BLANKET WRM UNDERBODY ADULT 221X91IN 24X48IN BR HGR PLMR 7OZ

## (undated) DEVICE — CATHETER CRYABL 28MM ARCTIC FRONT ADV PRO

## (undated) DEVICE — SET ANGIO 72IN IV TUBE MIC DRIP CHMBR MH FLTR MALE LL STRL

## (undated) DEVICE — V2 SPECIMEN COLLECTION MANIFOLD KIT: Brand: NEPTUNE

## (undated) DEVICE — GLOVE SURG 8 PROTEXIS LF CRM PF BEAD CUFF STRL PLISPRN 12IN

## (undated) DEVICE — LASSO POLYPECTOMY SNARE: Brand: LASSO

## (undated) DEVICE — BITEBLOCK ENDOSCP 60FR MAXI STRP

## (undated) DEVICE — 10FT COMBINED O2 DELIVERY/CO2 MONITORING. FILTER WITH MICROSTREAM TYPE LUER: Brand: DUAL ADULT NASAL CANNULA

## (undated) DEVICE — CATHETER 8FR 115CM 3.5MM 2-2-2MM SPACE DAMATO TYPE FX CRV

## (undated) DEVICE — HOLDER LIMB THK.25IN 13X3IN 31IN 2 PC 1 STRAP QRLSE BCKL

## (undated) DEVICE — 1200CC GUARDIAN II: Brand: GUARDIAN

## (undated) DEVICE — STERIS KITS

## (undated) DEVICE — CANNULA NASAL 02/C02 ADULT

## (undated) DEVICE — MEDI-VAC NON-CONDUCTIVE SUCTION TUBING 6MM X 1.8M (6FT.) L: Brand: CARDINAL HEALTH

## (undated) DEVICE — 3M™ RED DOT™ MONITORING ELECTRODE WITH FOAM TAPE AND STICKY GEL, 50/BAG, 20/CASE, 72/PLT 2570: Brand: RED DOT™

## (undated) DEVICE — DECANTER FLUID DSPNSR BAG BAJ DISP STRL LF ASEPTIC TRNSF

## (undated) DEVICE — SYRINGE 8ML ROTATE MALE LL ADPR RING GRIP MED INJECT8 CCS

## (undated) DEVICE — ELECTRODE DFBR A 6X4.25IN MLFNC RDTRNS POUCH PADPRO ADULT

## (undated) DEVICE — "SD-221U-25 DISP.CRESCENT SNARE-25MM": Brand: DISPOSABLE ELECTROSURGICAL SNARE

## (undated) DEVICE — DISPOSABLE EMR KIT: Brand: DISPOSABLE EMR KIT

## (undated) DEVICE — Device: Brand: CUSTOM PROCEDURE KIT

## (undated) DEVICE — LINE MNTR ADLT SET O2 INTMD

## (undated) DEVICE — CATHETER 8 ELECTRODE LOOP 3.3FR 20MM 4MM 1MM 165CM ACHV EP

## (undated) DEVICE — SET TBG 260CM 2 WAY CMUNC IRR PUMP CATH COOL PT

## (undated) DEVICE — INTRODUCER SHTH 8.5FR 71CM GW STRBL AGILIS NXT .032IN 22.4MM

## (undated) DEVICE — CABLE CATH UMBILICUS COAX STRL

## (undated) DEVICE — VALVE GUARDIAN 2 VSI HEMOSTASIS 8FR GW INS TOOL ROTOLOCK

## (undated) DEVICE — KIT CUSTOM ENDOPROCEDURE STERIS

## (undated) DEVICE — SYRINGE 20ML GRAD STRL MED DISP LL

## (undated) DEVICE — INTRODUCER SHTH 7FR 150CM 12CM HEMOSTASIS VLV SDPRT DIL

## (undated) DEVICE — CATHETER US VIEWFLEX X ICE

## (undated) DEVICE — KIT VLV 5 PC AIR H2O SUCT BX ENDOGATOR CONN

## (undated) DEVICE — SYRINGE 3ML SCL MARK INDIV WRAP STRL MED LF LL

## (undated) DEVICE — DRESSING TRANS 4.75X4IN ADH HPOAL WTPRF TEGADERM PU STD STRL

## (undated) DEVICE — CATHETER 10 ELECTRODE 6FR 2-5-2MM SPACE LG 4 CRV 110CM DYN

## (undated) DEVICE — DURACLIP 16MM 235CM

## (undated) DEVICE — SHEATH 8.5FR 63CM 180CM 55D 1 CRV PGTL GUIDE DIL RF WIRE

## (undated) DEVICE — SHEET TRNSF 1960X760MM 480MM MAXITUBE FLITES PRM TUBE LF